# Patient Record
Sex: MALE
[De-identification: names, ages, dates, MRNs, and addresses within clinical notes are randomized per-mention and may not be internally consistent; named-entity substitution may affect disease eponyms.]

---

## 2023-06-01 PROBLEM — Z00.00 ENCOUNTER FOR PREVENTIVE HEALTH EXAMINATION: Status: ACTIVE | Noted: 2023-06-01

## 2024-03-14 ENCOUNTER — APPOINTMENT (OUTPATIENT)
Dept: ORTHOPEDIC SURGERY | Facility: CLINIC | Age: 54
End: 2024-03-14
Payer: OTHER MISCELLANEOUS

## 2024-03-14 VITALS — WEIGHT: 190 LBS | BODY MASS INDEX: 28.14 KG/M2 | HEIGHT: 69 IN

## 2024-03-14 DIAGNOSIS — Z87.19 PERSONAL HISTORY OF OTHER DISEASES OF THE DIGESTIVE SYSTEM: ICD-10-CM

## 2024-03-14 DIAGNOSIS — Z78.9 OTHER SPECIFIED HEALTH STATUS: ICD-10-CM

## 2024-03-14 DIAGNOSIS — I10 ESSENTIAL (PRIMARY) HYPERTENSION: ICD-10-CM

## 2024-03-14 PROCEDURE — 99204 OFFICE O/P NEW MOD 45 MIN: CPT

## 2024-03-14 PROCEDURE — 73030 X-RAY EXAM OF SHOULDER: CPT | Mod: LT

## 2024-03-14 RX ORDER — OMEPRAZOLE 10 MG/1
10 CAPSULE, DELAYED RELEASE ORAL
Refills: 0 | Status: ACTIVE | COMMUNITY

## 2024-03-14 RX ORDER — LISINOPRIL 30 MG/1
TABLET ORAL
Refills: 0 | Status: ACTIVE | COMMUNITY

## 2024-03-14 RX ORDER — CYCLOBENZAPRINE HYDROCHLORIDE 5 MG/1
5 TABLET, FILM COATED ORAL
Qty: 30 | Refills: 2 | Status: ACTIVE | COMMUNITY
Start: 2024-03-14 | End: 1900-01-01

## 2024-03-14 NOTE — DATA REVIEWED
[Left] : left [MRI] : MRI [Shoulder] : shoulder [I independently reviewed and interpreted images and report] : I independently reviewed and interpreted images and report [FreeTextEntry1] : post procedure MRI shows high grade partial tear upper subscapularis with mild atrophy, low grade partial supra/infra tearing, proximal biceps proximal tenodesis with attenuation vs rupture into the groove. type 2 acromion. AC arthrosis

## 2024-03-14 NOTE — WORK
[Torn Ligament/Tendon/Muscle] : torn ligament, tendon or muscle [Was the competent medical cause of the injury] : was the competent medical cause of the injury [Are consistent with the injury] : are consistent with the injury [Consistent with my objective findings] : consistent with my objective findings [Mild Partial] : mild partial [Does not reveal pre-existing condition(s) that may affect treatment/prognosis] : does not reveal pre-existing condition(s) that may affect treatment/prognosis [Can return to work without limitations on ______] : can return to work without limitations on [unfilled] [N/A] : : Not Applicable [Patient] : patient [No Rx restrictions] : No Rx restrictions. [I provided the services listed above] :  I provided the services listed above. [Heavy Work:] : Heavy Work: Exerting 50 to 100 pounds of force occasionally, and/or 25 to 50 pounds of force frequently, and/or 10 to 20 pounds of force constantly to move objects. Physical demand requirements are in excess of those for Medium Work [FreeTextEntry1] : fair

## 2024-03-14 NOTE — DISCUSSION/SUMMARY
[de-identified] : History, clinical examination and imaging were most consistent with: -left shoulder recurrent high grade partial subscapularis tear, proximal biceps rupture   The diagnosis was explained in detail. The potential non-surgical and surgical treatments were reviewed. The relative risks and benefits of each option were considered relative to the patients age, activity level, medical history, symptom severity and previously attempted treatments.   The patient was advised to consult with their primary medical provider prior to initiation of any new medications to reduce the risk of adverse effects specific to their long-term home medications and medical history. The risk of gastrointestinal irritation and kidney injury specific to long-term NSAID use was discussed.   -Discussed the complex nature of his condition. We discussed continued non-surgical treatment versus revision surgery with arthroscopic subscapularis RCR, debridement, SAD and open biceps tenodesis. Discussed the risks of surgery and expected recovery. Patient wishes to continue with non-surgical treatment at this time.  -Patient defers additional PT and will proceed with a home exercise program. -Cortisone injection is discussed and deferred at this time. -Over the counter acetaminophen as needed for pain. -Flexeril as needed for spasms. -Patient can continue working full duty.  -Follow up in 6 weeks. Consider CSI vs surgical options if symptoms persist.    (MDM)   Problem Complexity -Moderate: acute, complicated injury  Risk -Moderate: prescription medication   -Patient has not been seen by another provider in my practice within the past 2 years who specializes in orthopedic surgery.

## 2024-03-14 NOTE — IMAGING
[Left] : left shoulder [There are no fractures, subluxations or dislocations. No significant abnormalities are seen] : There are no fractures, subluxations or dislocations. No significant abnormalities are seen [de-identified] : (Exam: Shoulder)   Laterality is left   Patient is in no acute distress, alert and oriented Sensation is grossly intact to light touch in the hand Motor function is 5/5 in the hand Capillary refill is less than 2 seconds in the fingers Lymphadenopathy is not present Peripheral edema is not present   Skin is intact Swelling is not present Atrophy is not present Scapular winging is not present Deformity of the AC joint is not present Deformity of the biceps is not present   Bicipital groove tenderness is present AC joint tenderness is not present Scapulothoracic tenderness is not present   Active forward elevation is 160 Passive forward elevation is 175 External rotation at the side is 60 Internal rotation behind the back is to the level of T12   Forward elevation strength is 5-/5 External rotation strength at the side is 5/5 Internal rotation strength at the side is 5-/5 Deltoid strength of anterior, posterior and lateral heads is 5/5   Peck test is abnormal OBriens test is abnormal Empty can test is abnormal Speeds test is abnormal Cross body adduction test is normal Belly press test is normal Apprehension and relocation is normal Sulcus sign is normal

## 2024-03-14 NOTE — HISTORY OF PRESENT ILLNESS
[de-identified] : Date of initial evaluation 03/14/2024 Patient age is 53 year  Occupation is Asphalt sauer Body part causing symptoms is the LT shoulder  Symptoms began 10/12/22, he was at work, coming down a ladder, he slipped and the arm was hyper extended He continued to work on the day of injury, went to [facility] the next day He was diagnosed with a rotator cuff and labral injury and treated by Dr Richard Gallegos He had shoulder surgery on 2/10/23 by Dr Gallegos, op report states debridement, SAD, arthroscopic BT. op report states the tenodesis anchor broke during insertion He went back to work 3 months later on 5/10/23 Since that time he has been working full duty He reports that it has remained painful since the surgery Location of pain is lateral Quality of pain is sharp and burning  Pain score at rest is 6 Pain score during activity is 9 Radicular symptoms are not present Prior treatments since the surgery include CSI x2 most recently in November 2023 with partial relief He has done extensive post procedure PT Patient's condition is associated with workers compensation, DOI 10/12/22

## 2024-04-20 ENCOUNTER — APPOINTMENT (OUTPATIENT)
Dept: ORTHOPEDIC SURGERY | Facility: CLINIC | Age: 54
End: 2024-04-20
Payer: OTHER MISCELLANEOUS

## 2024-04-20 PROCEDURE — 99204 OFFICE O/P NEW MOD 45 MIN: CPT

## 2024-04-20 PROCEDURE — 99203 OFFICE O/P NEW LOW 30 MIN: CPT

## 2024-04-22 NOTE — HISTORY OF PRESENT ILLNESS
[Neck] : neck [Work related] : work related [Sudden] : sudden [Dull/Aching] : dull/aching [Radiating] : radiating [Sharp] : sharp [Constant] : constant [Household chores] : household chores [Leisure] : leisure [Sleep] : sleep [Full time] : Work status: full time [de-identified] : Patient presents for evaluation of cervical spine. Reports work related injury on 10/12/2022. Describes a fall from a ladder, resulted in left RTC and labral tear for which surgery was performed. Describes persistent neck pain, radiation of pain and paresthesias to the left arm, forearm and hand. Treated with trigger point injections and muscle relaxers. [] : no [FreeTextEntry3] : 10/12/2022 [FreeTextEntry5] : COMING DOWN LADDER, SLIPPED AND HELD ON WITH LEFT ARM  [FreeTextEntry7] : DOWN LEFT ARM  [de-identified] : [facility]  [de-identified] : [facility]  [de-identified] : [facility]

## 2024-04-22 NOTE — DISCUSSION/SUMMARY
[de-identified] : Discussed medical mgmt, judicious use muscle relaxers. Nsaids contraindicated with Barretts esophagus. Continue exercise based rehabilitation and activity modification. Referred to pain mgmt for LOVE. Discussed potential surtgical candidacy if refractory to non operative mgmt. Cumulative encounter duration exceeded 45 minutes.

## 2024-04-22 NOTE — PHYSICAL EXAM
[Normal Coordination] : normal coordination [Normal DTR UE/LE] : normal DTR UE/LE  [Normal Sensation] : normal sensation [Normal Mood and Affect] : normal mood and affect [Oriented] : oriented [Able to Communicate] : able to communicate [Normal Skin] : normal skin [No Rash] : no rash [No Ulcers] : no ulcers [No Lesions] : no lesions [No obvious lymphadenopathy in areas examined] : no obvious lymphadenopathy in areas examined [Well Developed] : well developed [Peripheral vascular exam is grossly normal] : peripheral vascular exam is grossly normal [No Respiratory Distress] : no respiratory distress [Extension] : extension [] : positive Spurling

## 2024-04-25 ENCOUNTER — APPOINTMENT (OUTPATIENT)
Dept: ORTHOPEDIC SURGERY | Facility: CLINIC | Age: 54
End: 2024-04-25
Payer: OTHER MISCELLANEOUS

## 2024-04-25 VITALS — HEIGHT: 69 IN | WEIGHT: 189 LBS | BODY MASS INDEX: 27.99 KG/M2

## 2024-04-25 PROCEDURE — 20610 DRAIN/INJ JOINT/BURSA W/O US: CPT | Mod: LT

## 2024-04-25 PROCEDURE — 99214 OFFICE O/P EST MOD 30 MIN: CPT | Mod: 25

## 2024-04-25 NOTE — IMAGING
[Left] : left shoulder [There are no fractures, subluxations or dislocations. No significant abnormalities are seen] : There are no fractures, subluxations or dislocations. No significant abnormalities are seen [de-identified] : (Exam: Shoulder)   Laterality is left   Patient is in no acute distress, alert and oriented Sensation is grossly intact to light touch in the hand Motor function is 5/5 in the hand Capillary refill is less than 2 seconds in the fingers Lymphadenopathy is not present Peripheral edema is not present   Skin is intact Swelling is not present Atrophy is not present Scapular winging is not present Deformity of the AC joint is not present Deformity of the biceps is not present   Bicipital groove tenderness is present AC joint tenderness is not present Scapulothoracic tenderness is not present   Active forward elevation is 160 Passive forward elevation is 175 External rotation at the side is 60 Internal rotation behind the back is to the level of T12   Forward elevation strength is 5-/5 External rotation strength at the side is 5/5 Internal rotation strength at the side is 5-/5 Deltoid strength of anterior, posterior and lateral heads is 5/5   Peck test is abnormal OBriens test is abnormal Empty can test is abnormal Speeds test is abnormal Cross body adduction test is normal Belly press test is normal Apprehension and relocation is normal Sulcus sign is normal

## 2024-04-25 NOTE — DISCUSSION/SUMMARY
[de-identified] : History, clinical examination and imaging were most consistent with: -left shoulder recurrent high grade partial subscapularis tear, proximal biceps rupture   The diagnosis was explained in detail. The potential non-surgical and surgical treatments were reviewed. The relative risks and benefits of each option were considered relative to the patients age, activity level, medical history, symptom severity and previously attempted treatments.   The patient was advised to consult with their primary medical provider prior to initiation of any new medications to reduce the risk of adverse effects specific to their long-term home medications and medical history. The risk of gastrointestinal irritation and kidney injury specific to long-term NSAID use was discussed.   -Patient wishes to continue working and defers surgical treatment at this time. -Cortisone injection performed today for symptom relief. -Discussed that if symptoms persist he is candidate for revision shoulder surgery with arthroscopic subscapularis RCR, debridement, SAD and open biceps tenodesis. -Patient defers additional PT and will proceed with a home exercise program. -Over the counter acetaminophen as needed for pain. -Flexeril as needed for spasms. -Work status remains full duty.  -Cervical care as per Dr. Taylor.  -Follow up in 2 months, will revisit surgical options if symptoms persist.   (MDM)   Problem Complexity -Moderate: acute, complicated injury  Risk -Moderate: injection  (Procedure: Corticosteroid Injection)   Injection location was the: -left subacromial bursa   Injection contents were: 40 mg of kenalog and 5 mL of 1% lidocaine   Procedure Details: -Informed consent was obtained. Discussed possible risks of corticosteroid injection including hyperglycemia, infection and skin discoloration. -Discussed that due to infection risk, an interval between injection and any future surgery is 6 weeks for arthroscopy and 3 months for arthroplasty. -Injection performed using aseptic technique. Hemostasis was confirmed. -Procedure was tolerated well with no complications.

## 2024-04-25 NOTE — HISTORY OF PRESENT ILLNESS
[de-identified] : 4/25/24: Follow up on left shoulder. doing HEP. working full duty. ongoing cervical care by dr rich and planning for epidural.   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 the next day He was diagnosed with a rotator cuff and labral injury and treated by Dr Richard Gallegos He had shoulder surgery on 2/10/23 by Dr Gallegos, op report states debridement, SAD, arthroscopic BT. op report states the tenodesis anchor broke during insertion He went back to work 3 months later on 5/10/23 Since that time he has been working full duty He reports that it has remained painful since the surgery Location of pain is lateral Quality of pain is sharp and burning  Pain score at rest is 6 Pain score during activity is 9 Radicular symptoms are not present Prior treatments since the surgery include CSI x2 most recently in November 2023 with partial relief He has done extensive post procedure PT Patient's condition is associated with workers compensation, DOI 10/12/22

## 2024-04-25 NOTE — DATA REVIEWED
[MRI] : MRI [Left] : left [Shoulder] : shoulder [I independently reviewed and interpreted images and report] : I independently reviewed and interpreted images and report [FreeTextEntry1] : post procedure MRI shows high grade partial tear upper subscapularis with mild atrophy, low grade partial supra/infra tearing, proximal biceps proximal tenodesis with attenuation vs rupture into the groove. type 2 acromion. AC arthrosis

## 2024-06-03 ENCOUNTER — APPOINTMENT (OUTPATIENT)
Dept: ORTHOPEDIC SURGERY | Facility: CLINIC | Age: 54
End: 2024-06-03
Payer: OTHER MISCELLANEOUS

## 2024-06-03 VITALS — WEIGHT: 189 LBS | HEIGHT: 69 IN | BODY MASS INDEX: 27.99 KG/M2

## 2024-06-03 DIAGNOSIS — M54.12 RADICULOPATHY, CERVICAL REGION: ICD-10-CM

## 2024-06-03 PROCEDURE — 99214 OFFICE O/P EST MOD 30 MIN: CPT

## 2024-06-04 PROBLEM — M54.12 CERVICAL RADICULOPATHY, CHRONIC: Status: ACTIVE | Noted: 2024-04-20

## 2024-06-04 NOTE — DISCUSSION/SUMMARY
[de-identified] : Discussed medical mgmt. Nsaids contraindicated with Barretts esophagus, c/t Tylenol prn. Periodic TPI vs shoulder CSI. Continue exercise based rehabilitation and activity modification. Pt elects to hold off on LOVE due to controlled symptoms and hesitancy toward injection. Discussed potential surgical candidacy(ACDF C5-6) if refractory to non operative mgmt. F/u 6/8ntinuation of full duty work. F/u 6/8 wks. Cumulative encounter duration exceeded 30 minutes.  Prior to appointment and during encounter with patient extensive medical records were reviewed including but not limited to, hospital records, outpatient records, imaging results, and lab data. During this appointment the patient was examined, diagnoses were discussed and explained in a face to face manner. In addition extensive time was spent reviewing aforementioned diagnostic studies. Counseling including abnormal image results, differential diagnoses, treatment options, risk and benefits, lifestyle changes, current condition, and current medications was performed. Patient's comments, questions, and concerns were addressed and patient verbalized understanding. Based on this patient's presentation at our office, which is an orthopedic spine surgeon's office, this patient inherently / intrinsically has a risk, however minute, of developing issues such as Cauda equina syndrome, bowel and bladder changes, or progression of motor or neurological deficits such as paralysis which may be permanent.  MARYBETH DUPREE Acting as a Scribe for Dr. Brandon GARCIA, Marybeth Dupree, attest that this documentation has been prepared under the direction and in the presence of Provider Doroteo Taylor MD.

## 2024-06-04 NOTE — HISTORY OF PRESENT ILLNESS
[7] : 7 [2] : 2 [Full time] : Work status: full time [] : yes [de-identified] : 06/03/2024 - Patient presents for fuv. C/o neck pain with intermittent pain into left arm, Pain worsens with increased physical atcivity. Periodic trigger points helpful for symptom control. Under care with Dr. Tyson with csi in shoulder, which he states helped his pain in left shoulder and neck. Limited nsaids due to barets dx, takes Tylenol. Cyclobenzaprine at night caused grogginess, d/c.   04/20/2024 - Patient presents for evaluation of cervical spine. Reports work related injury on 10/12/2022. Describes a fall from a ladder, resulted in left RTC and labral tear for which surgery was performed. Describes persistent neck pain, radiation of pain and paresthesias to the left arm, forearm and hand. Treated with trigger point injections and muscle relaxers. [de-identified] : pain mgmt- tpi

## 2024-06-27 ENCOUNTER — APPOINTMENT (OUTPATIENT)
Dept: ORTHOPEDIC SURGERY | Facility: CLINIC | Age: 54
End: 2024-06-27
Payer: OTHER MISCELLANEOUS

## 2024-06-27 VITALS — HEIGHT: 69 IN | WEIGHT: 190 LBS | BODY MASS INDEX: 28.14 KG/M2

## 2024-06-27 DIAGNOSIS — M75.112 INCOMPLETE ROTATOR CUFF TEAR OR RUPTURE OF LEFT SHOULDER, NOT SPECIFIED AS TRAUMATIC: ICD-10-CM

## 2024-06-27 DIAGNOSIS — S46.212A STRAIN OF MUSCLE, FASCIA AND TENDON OF OTHER PARTS OF BICEPS, LEFT ARM, INITIAL ENCOUNTER: ICD-10-CM

## 2024-06-27 PROCEDURE — 99214 OFFICE O/P EST MOD 30 MIN: CPT

## 2024-07-15 ENCOUNTER — APPOINTMENT (OUTPATIENT)
Dept: ORTHOPEDIC SURGERY | Facility: CLINIC | Age: 54
End: 2024-07-15
Payer: OTHER MISCELLANEOUS

## 2024-07-15 VITALS — WEIGHT: 190 LBS | HEIGHT: 69 IN | BODY MASS INDEX: 28.14 KG/M2

## 2024-07-15 DIAGNOSIS — M54.12 RADICULOPATHY, CERVICAL REGION: ICD-10-CM

## 2024-07-15 PROCEDURE — 99214 OFFICE O/P EST MOD 30 MIN: CPT

## 2024-08-08 ENCOUNTER — APPOINTMENT (OUTPATIENT)
Dept: ORTHOPEDIC SURGERY | Facility: CLINIC | Age: 54
End: 2024-08-08

## 2024-08-08 PROBLEM — S63.501A SPRAIN OF RIGHT WRIST, INITIAL ENCOUNTER: Status: ACTIVE | Noted: 2024-08-08

## 2024-08-08 PROBLEM — M75.111 INCOMPLETE TEAR OF RIGHT ROTATOR CUFF, UNSPECIFIED WHETHER TRAUMATIC: Status: ACTIVE | Noted: 2024-08-08

## 2024-08-08 PROCEDURE — 73030 X-RAY EXAM OF SHOULDER: CPT | Mod: RT

## 2024-08-08 PROCEDURE — 73110 X-RAY EXAM OF WRIST: CPT | Mod: RT

## 2024-08-08 PROCEDURE — 99214 OFFICE O/P EST MOD 30 MIN: CPT

## 2024-08-08 NOTE — WORK
[Sprain/Strain] : sprain/strain [Torn Ligament/Tendon/Muscle] : torn ligament, tendon or muscle [Was the competent medical cause of the injury] : was the competent medical cause of the injury [Are consistent with the injury] : are consistent with the injury [Consistent with my objective findings] : consistent with my objective findings [Severe Partial] : severe partial [Does not reveal pre-existing condition(s) that may affect treatment/prognosis] : does not reveal pre-existing condition(s) that may affect treatment/prognosis [Can return to work with limitations on ______] : can return to work with limitations on [unfilled] [Patient] : patient [No Rx restrictions] : No Rx restrictions. [I provided the services listed above] :  I provided the services listed above. [FreeTextEntry1] : fair [Light Work:] : Light Work: Exerting up to 20 pounds of force occasionally, and/or up to 10 pounds of force frequently and/or negligible amount of force constantly to move objects. Physical demand requirements are in excess of those for Sedentary Work. Even though the weight lifted may only be a negligible amount, a job should be rated Light Work: (1) when it requires walking or standing to a significant degree: or (2) when it requires sitting most of the time but entails pushing and/or pulling of arm or leg controls: and/or (3) when the job requires working at a production rate pace entailing the constant pushing and/or pulling of materials even though the weight of those materials is negligible. NOTE: The constant stress of maintaining a production rate pace, especially in an industrial setting, can be and is physically demanding of a worker even though the amount of force exerted is negligible.

## 2024-08-08 NOTE — DISCUSSION/SUMMARY
[de-identified] : History, clinical examination and imaging were most consistent with: -right shoulder rotator cuff partial vs complete tear -right wrist sprain   The diagnosis was explained in detail. The potential non-surgical and surgical treatments were reviewed. The relative risks and benefits of each option were considered relative to the patients age, activity level, medical history, symptom severity and previously attempted treatments.   The patient was advised to consult with their primary medical provider prior to initiation of any new medications to reduce the risk of adverse effects specific to their long-term home medications and medical history. The risk of gastrointestinal irritation and kidney injury specific to long-term NSAID use was discussed.   -Medrol dose pack prescribed for pain. -MRI of the shoulder ordered to evaluate for rotator cuff and/or labral tear. -MRI of the wrist ordered to evaluate for ligament tear and/or occult fracture. -Work status will remain light duty. -Follow up with hand specialist Dr. Castano when wrist MRI completed. -Follow up with me when shoulder MRI completed.   (MDM)   Problem Complexity -Moderate: acute, complicated injury   Risk -Moderate: prescription medication

## 2024-08-08 NOTE — HISTORY OF PRESENT ILLNESS
[de-identified] : Date of initial evaluation is 08/08/2024 Patient age is 54 year  Occupation is [employer]  Body part causing symptoms is the right shoulder, right wrist Symptoms began 08/05/2024, he was struck by a machine and fell onto his right side Location of pain is lateral shoulder, dorsal wrist He denies right shoulder or wrist problem before the injury He has been working light duty since the injury Quality of pain is sharp Pain score at rest is 8/10 Pain score during activity is 10/10 Radicular symptoms are not present Prior treatments include Tylenol  Patient's condition is associated with workers compensation

## 2024-08-08 NOTE — DISCUSSION/SUMMARY
[de-identified] : History, clinical examination and imaging were most consistent with: -right shoulder rotator cuff partial vs complete tear -right wrist sprain   The diagnosis was explained in detail. The potential non-surgical and surgical treatments were reviewed. The relative risks and benefits of each option were considered relative to the patients age, activity level, medical history, symptom severity and previously attempted treatments.   The patient was advised to consult with their primary medical provider prior to initiation of any new medications to reduce the risk of adverse effects specific to their long-term home medications and medical history. The risk of gastrointestinal irritation and kidney injury specific to long-term NSAID use was discussed.   -Medrol dose pack prescribed for pain. -MRI of the shoulder ordered to evaluate for rotator cuff and/or labral tear. -MRI of the wrist ordered to evaluate for ligament tear and/or occult fracture. -Work status will remain light duty. -Follow up with hand specialist Dr. Castano when wrist MRI completed. -Follow up with me when shoulder MRI completed.   (MDM)   Problem Complexity -Moderate: acute, complicated injury   Risk -Moderate: prescription medication

## 2024-08-08 NOTE — IMAGING
[de-identified] : (Exam: Shoulder)   Laterality is right    Patient is in no acute distress, alert and oriented Sensation is grossly intact to light touch in the hand Motor function is 5/5 in the hand Capillary refill is less than 2 seconds in the fingers Lymphadenopathy is not present Peripheral edema is not present   Skin is intact Swelling is not present Atrophy is not present Scapular winging is not present Deformity of the AC joint is not present Deformity of the biceps is not present   Bicipital groove tenderness is present AC joint tenderness is not present Scapulothoracic tenderness is not present   Active forward elevation is 120 Passive forward elevation is 150 External rotation at the side is 60 Internal rotation behind the back is to the level of T12   Forward elevation strength is 4/5 External rotation strength at the side is 5/5 Internal rotation strength at the side is 5/5 Deltoid strength of anterior, posterior and lateral heads is 5/5   Peck test is abnormal OBriens test is abnormal Empty can test is abnormal Speeds test is abnormal Cross body adduction test is normal Belly press test is normal Apprehension and relocation is normal Sulcus sign is normal  (Exam: Wrist and Hand)   Laterality is right    Patient is in no acute distress, alert and oriented Sensation is grossly intact to light touch in the hand Motor function is 5/5 in the hand Capillary refill is less than 2 seconds in the fingers Lymphadenopathy is not present Peripheral edema is not present   Skin is intact Nails are intact Swelling is not present Scissoring of digits is not present Extensor lag of digits is not present Atrophy of hand musculature is not present  Snuffbox tenderness is not present Distal radius tenderness is present Distal ulna tenderness is present Metacarpal tenderness is not present Finger tenderness is not present   Wrist flexion is 70 Wrist extension is 70 Pronation is 80 Supination is 80 Finger flexion is to palm   Wrist extension strength is 5/5 Wrist flexion strength is 5/5 Finger flexion strength is 5/5 Finger extension strength is 5/5 Finger abduction strength is 5/5   Tinel test at wrist is normal Tinel test at elbow is normal Cooley shift test is normal DRU shuck test is normal Finklestein test is normal    [Right] : right wrist [There are no fractures, subluxations or dislocations. No significant abnormalities are seen] : There are no fractures, subluxations or dislocations. No significant abnormalities are seen

## 2024-08-08 NOTE — HISTORY OF PRESENT ILLNESS
[de-identified] : Date of initial evaluation is 08/08/2024 Patient age is 54 year  Occupation is [employer]  Body part causing symptoms is the right shoulder, right wrist Symptoms began 08/05/2024, he was struck by a machine and fell onto his right side Location of pain is lateral shoulder, dorsal wrist He denies right shoulder or wrist problem before the injury He has been working light duty since the injury Quality of pain is sharp Pain score at rest is 8/10 Pain score during activity is 10/10 Radicular symptoms are not present Prior treatments include Tylenol  Patient's condition is associated with workers compensation

## 2024-08-08 NOTE — IMAGING
[de-identified] : (Exam: Shoulder)   Laterality is right    Patient is in no acute distress, alert and oriented Sensation is grossly intact to light touch in the hand Motor function is 5/5 in the hand Capillary refill is less than 2 seconds in the fingers Lymphadenopathy is not present Peripheral edema is not present   Skin is intact Swelling is not present Atrophy is not present Scapular winging is not present Deformity of the AC joint is not present Deformity of the biceps is not present   Bicipital groove tenderness is present AC joint tenderness is not present Scapulothoracic tenderness is not present   Active forward elevation is 120 Passive forward elevation is 150 External rotation at the side is 60 Internal rotation behind the back is to the level of T12   Forward elevation strength is 4/5 External rotation strength at the side is 5/5 Internal rotation strength at the side is 5/5 Deltoid strength of anterior, posterior and lateral heads is 5/5   Pekc test is abnormal OBriens test is abnormal Empty can test is abnormal Speeds test is abnormal Cross body adduction test is normal Belly press test is normal Apprehension and relocation is normal Sulcus sign is normal  (Exam: Wrist and Hand)   Laterality is right    Patient is in no acute distress, alert and oriented Sensation is grossly intact to light touch in the hand Motor function is 5/5 in the hand Capillary refill is less than 2 seconds in the fingers Lymphadenopathy is not present Peripheral edema is not present   Skin is intact Nails are intact Swelling is not present Scissoring of digits is not present Extensor lag of digits is not present Atrophy of hand musculature is not present  Snuffbox tenderness is not present Distal radius tenderness is present Distal ulna tenderness is present Metacarpal tenderness is not present Finger tenderness is not present   Wrist flexion is 70 Wrist extension is 70 Pronation is 80 Supination is 80 Finger flexion is to palm   Wrist extension strength is 5/5 Wrist flexion strength is 5/5 Finger flexion strength is 5/5 Finger extension strength is 5/5 Finger abduction strength is 5/5   Tinel test at wrist is normal Tinel test at elbow is normal Cooley shift test is normal DRU shuck test is normal Finklestein test is normal    [Right] : right wrist [There are no fractures, subluxations or dislocations. No significant abnormalities are seen] : There are no fractures, subluxations or dislocations. No significant abnormalities are seen

## 2024-08-15 ENCOUNTER — APPOINTMENT (OUTPATIENT)
Dept: ORTHOPEDIC SURGERY | Facility: CLINIC | Age: 54
End: 2024-08-15
Payer: OTHER MISCELLANEOUS

## 2024-08-15 DIAGNOSIS — S46.212A STRAIN OF MUSCLE, FASCIA AND TENDON OF OTHER PARTS OF BICEPS, LEFT ARM, INITIAL ENCOUNTER: ICD-10-CM

## 2024-08-15 DIAGNOSIS — M75.112 INCOMPLETE ROTATOR CUFF TEAR OR RUPTURE OF LEFT SHOULDER, NOT SPECIFIED AS TRAUMATIC: ICD-10-CM

## 2024-08-15 PROCEDURE — 99214 OFFICE O/P EST MOD 30 MIN: CPT

## 2024-08-15 NOTE — WORK
[Sprain/Strain] : sprain/strain [Torn Ligament/Tendon/Muscle] : torn ligament, tendon or muscle [Was the competent medical cause of the injury] : was the competent medical cause of the injury [Are consistent with the injury] : are consistent with the injury [Consistent with my objective findings] : consistent with my objective findings [Mild Partial] : mild partial [Does not reveal pre-existing condition(s) that may affect treatment/prognosis] : does not reveal pre-existing condition(s) that may affect treatment/prognosis [N/A] : : Not Applicable [Patient] : patient [No Rx restrictions] : No Rx restrictions. [I provided the services listed above] :  I provided the services listed above. [FreeTextEntry1] : fair

## 2024-08-15 NOTE — IMAGING
[Left] : left shoulder [Right] : right wrist [There are no fractures, subluxations or dislocations. No significant abnormalities are seen] : There are no fractures, subluxations or dislocations. No significant abnormalities are seen [de-identified] : (Exam: Shoulder)   Laterality is left   Patient is in no acute distress, alert and oriented Sensation is grossly intact to light touch in the hand Motor function is 5/5 in the hand Capillary refill is less than 2 seconds in the fingers Lymphadenopathy is not present Peripheral edema is not present   Skin is intact Swelling is not present Atrophy is not present Scapular winging is not present Deformity of the AC joint is not present Deformity of the biceps is not present   Bicipital groove tenderness is present AC joint tenderness is not present Scapulothoracic tenderness is not present   Active forward elevation is 160 Passive forward elevation is 175 External rotation at the side is 60 Internal rotation behind the back is to the level of T12   Forward elevation strength is 5-/5 External rotation strength at the side is 5/5 Internal rotation strength at the side is 5-/5 Deltoid strength of anterior, posterior and lateral heads is 5/5   Peck test is abnormal OBriens test is abnormal Empty can test is abnormal Speeds test is abnormal Cross body adduction test is normal Belly press test is normal Apprehension and relocation is normal Sulcus sign is normal

## 2024-08-15 NOTE — DISCUSSION/SUMMARY
[de-identified] : History, clinical examination and imaging were most consistent with: -left shoulder recurrent high grade partial subscapularis tear, proximal biceps rupture   The diagnosis was explained in detail. The potential non-surgical and surgical treatments were reviewed. The relative risks and benefits of each option were considered relative to the patients age, activity level, medical history, symptom severity and previously attempted treatments.   The patient was advised to consult with their primary medical provider prior to initiation of any new medications to reduce the risk of adverse effects specific to their long-term home medications and medical history. The risk of gastrointestinal irritation and kidney injury specific to long-term NSAID use was discussed.   -Patient's left shoulder symptoms are unchanged. He wishes to avoid additional interventions at this time.  -Continue home exercise program. -Repeat injection is deferred today.  -Over the counter acetaminophen as needed for pain. -Flexeril as needed for spasms. -Work status remains full duty for the LT shoulder. He is light duty for the right shoulder due to other WC injury.  -Discussed that if symptoms persist he is candidate for revision shoulder surgery with arthroscopic subscapularis RCR, debridement, SAD and open biceps tenodesis. -Follow up in 2 months, consider repeat CSI if symptoms persist.  (MDM)   Problem Complexity -Moderate: acute, complicated injury  Risk -Moderate: prescription medication

## 2024-08-15 NOTE — HISTORY OF PRESENT ILLNESS
[de-identified] : 08/15/2024: f/u for left shoulder. Taking Cyclobenzaprine prn. Symptoms remain unchanged. Working light duty due to his right shoulder other WC injury. full duty for the LT shoulder.   06/27/24: Follow up on left shoulder. Patient had CSI last visit with relief. Patient has occasional pain. taking tylenol and flexeril. still working full duty.   4/25/24: Follow up on left shoulder. doing HEP. working full duty. ongoing cervical care by dr rich and planning for epidural.   Date of initial evaluation 03/14/2024 Patient age is 53 year  Occupation is Asphalt sauer Body part causing symptoms is the LT shoulder  Symptoms began 10/12/22, he was at work, coming down a ladder, he slipped and the arm was hyper extended He continued to work on the day of injury, went to [facility] the next day He was diagnosed with a rotator cuff and labral injury and treated by Dr Richard Gallegos He had shoulder surgery on 2/10/23 by Dr Gallegos, op report states debridement, SAD, arthroscopic BT. op report states the tenodesis anchor broke during insertion He went back to work 3 months later on 5/10/23 Since that time he has been working full duty He reports that it has remained painful since the surgery Location of pain is lateral Quality of pain is sharp and burning  Pain score at rest is 6 Pain score during activity is 9 Radicular symptoms are not present Prior treatments since the surgery include CSI x2 most recently in November 2023 with partial relief He has done extensive post procedure PT Patient's condition is associated with workers compensation, DOI 10/12/22

## 2024-08-19 ENCOUNTER — RESULT REVIEW (OUTPATIENT)
Age: 54
End: 2024-08-19

## 2024-08-22 ENCOUNTER — APPOINTMENT (OUTPATIENT)
Dept: ORTHOPEDIC SURGERY | Facility: CLINIC | Age: 54
End: 2024-08-22
Payer: OTHER MISCELLANEOUS

## 2024-08-22 DIAGNOSIS — S43.431A SUPERIOR GLENOID LABRUM LESION OF RIGHT SHOULDER, INITIAL ENCOUNTER: ICD-10-CM

## 2024-08-22 DIAGNOSIS — M75.111 INCOMPLETE ROTATOR CUFF TEAR OR RUPTURE OF RIGHT SHOULDER, NOT SPECIFIED AS TRAUMATIC: ICD-10-CM

## 2024-08-22 PROCEDURE — 99214 OFFICE O/P EST MOD 30 MIN: CPT

## 2024-08-22 NOTE — WORK
[Sprain/Strain] : sprain/strain [Torn Ligament/Tendon/Muscle] : torn ligament, tendon or muscle [Was the competent medical cause of the injury] : was the competent medical cause of the injury [Are consistent with the injury] : are consistent with the injury [Consistent with my objective findings] : consistent with my objective findings [Does not reveal pre-existing condition(s) that may affect treatment/prognosis] : does not reveal pre-existing condition(s) that may affect treatment/prognosis [N/A] : : Not Applicable [Patient] : patient [No Rx restrictions] : No Rx restrictions. [I provided the services listed above] :  I provided the services listed above.

## 2024-08-22 NOTE — DATA REVIEWED
[MRI] : MRI [Shoulder] : shoulder [I independently reviewed and interpreted images and report] : I independently reviewed and interpreted images and report

## 2024-08-22 NOTE — IMAGING
[Left] : left shoulder [Right] : right wrist [There are no fractures, subluxations or dislocations. No significant abnormalities are seen] : There are no fractures, subluxations or dislocations. No significant abnormalities are seen

## 2024-08-26 ENCOUNTER — APPOINTMENT (OUTPATIENT)
Dept: ORTHOPEDIC SURGERY | Facility: CLINIC | Age: 54
End: 2024-08-26
Payer: OTHER MISCELLANEOUS

## 2024-08-26 VITALS — BODY MASS INDEX: 28.14 KG/M2 | HEIGHT: 69 IN | WEIGHT: 190 LBS

## 2024-08-26 DIAGNOSIS — M54.12 RADICULOPATHY, CERVICAL REGION: ICD-10-CM

## 2024-08-26 PROCEDURE — 99214 OFFICE O/P EST MOD 30 MIN: CPT

## 2024-08-28 NOTE — DISCUSSION/SUMMARY
[de-identified] : 54 Y M with cervical radiculopathy. Nsaids contraindicated with Barretts esophagus, Diverticulitis. Continue exercise based rehabilitation and activity modification. Pt elects to hold off on LOVE due t6o hesitancy toward injection. Discussed potential surgical candidacy(ACDF C5-6) if refractory to non operative mgmt. Surgical risks, benefits and expected outcomes have been explained to the patient. Patient was understanding and in agreement. Pt will obtain TENS unit authorized by workers comp.  Follow up in 6 weeks  Prior to appointment and during encounter with patient extensive medical records were reviewed including but not limited to, hospital records, outpatient records, imaging results, and lab data. During this appointment the patient was examined, diagnoses were discussed and explained in a face to face manner. In addition extensive time was spent reviewing aforementioned diagnostic studies. Counseling including abnormal image results, differential diagnoses, treatment options, risk and benefits, lifestyle changes, current condition, and current medications was performed. Patient's comments, questions, and concerns were addressed and patient verbalized understanding. Based on this patient's presentation at our office, which is an orthopedic spine surgeon's office, this patient inherently / intrinsically has a risk, however minute, of developing issues such as Cauda equina syndrome, bowel and bladder changes, or progression of motor or neurological deficits such as paralysis which may be permanent.  MARYBETH DUPREE Acting as a Scribe for Dr. Brandon GARCIA, Marybeth Dupree, attest that this documentation has been prepared under the direction and in the presence of Provider Doroteo Taylor MD.

## 2024-08-28 NOTE — HISTORY OF PRESENT ILLNESS
[Neck] : neck [Work related] : work related [7] : 7 [Dull/Aching] : dull/aching [Constant] : constant [Household chores] : household chores [Rest] : rest [Meds] : meds [Standing] : standing [Full time] : Work status: full time [de-identified] : 08/26/2024 - Patient presenting for follow up. No real changes in symptoms - he complains of tingling in arms. States TENS unit was recently granted by  and he is looking to obtaining the unit. He is no longer in PT. DX barrettes esophagus and diverticulitis - not taking meds for pain. Not interested in pm injections. Expresses at some point he is considering cervical surgery in the future.   07/15/2024:  Patient presenting today for a follow up visit. Reports an episode last week of tightness and could not move his neck. Treated with ice, heat, cyclobenzaprine, and tens machine, which he found beneficial. Experiencing some radiating pain, but no numbness and tingling. Overall pain remains the same since last visit. Received CSI in shoulder, which provided relief. Strength is intact.  06/03/2024 - Patient presents for fuv. C/o neck pain with intermittent pain into left arm, Pain worsens with increased physical atcivity. Periodic trigger points helpful for symptom control. Under care with Dr. Tyson with csi in shoulder, which he states helped his pain in left shoulder and neck. Limited nsaids due to barets dx, takes Tylenol. Cyclobenzaprine at night caused grogginess, d/c.   04/20/2024 - Patient presents for evaluation of cervical spine. Reports work related injury on 10/12/2022. Describes a fall from a ladder, resulted in left RTC and labral tear for which surgery was performed. Describes persistent neck pain, radiation of pain and paresthesias to the left arm, forearm and hand. Treated with trigger point injections and muscle relaxers. [] : no [FreeTextEntry7] : Lion shoulders [FreeTextEntry3] : 10/12/2022 [de-identified] : [employer] [place]

## 2024-08-28 NOTE — HISTORY OF PRESENT ILLNESS
[Neck] : neck [Work related] : work related [7] : 7 [Dull/Aching] : dull/aching [Constant] : constant [Household chores] : household chores [Rest] : rest [Meds] : meds [Standing] : standing [Full time] : Work status: full time [de-identified] : 08/26/2024 - Patient presenting for follow up. No real changes in symptoms - he complains of tingling in arms. States TENS unit was recently granted by  and he is looking to obtaining the unit. He is no longer in PT. DX barrettes esophagus and diverticulitis - not taking meds for pain. Not interested in pm injections. Expresses at some point he is considering cervical surgery in the future.   07/15/2024:  Patient presenting today for a follow up visit. Reports an episode last week of tightness and could not move his neck. Treated with ice, heat, cyclobenzaprine, and tens machine, which he found beneficial. Experiencing some radiating pain, but no numbness and tingling. Overall pain remains the same since last visit. Received CSI in shoulder, which provided relief. Strength is intact.  06/03/2024 - Patient presents for fuv. C/o neck pain with intermittent pain into left arm, Pain worsens with increased physical atcivity. Periodic trigger points helpful for symptom control. Under care with Dr. Tyson with csi in shoulder, which he states helped his pain in left shoulder and neck. Limited nsaids due to barets dx, takes Tylenol. Cyclobenzaprine at night caused grogginess, d/c.   04/20/2024 - Patient presents for evaluation of cervical spine. Reports work related injury on 10/12/2022. Describes a fall from a ladder, resulted in left RTC and labral tear for which surgery was performed. Describes persistent neck pain, radiation of pain and paresthesias to the left arm, forearm and hand. Treated with trigger point injections and muscle relaxers. [] : no [FreeTextEntry3] : 10/12/2022 [FreeTextEntry7] : Lion shoulders [de-identified] : [employer] [place]

## 2024-08-28 NOTE — DISCUSSION/SUMMARY
[de-identified] : 54 Y M with cervical radiculopathy. Nsaids contraindicated with Barretts esophagus, Diverticulitis. Continue exercise based rehabilitation and activity modification. Pt elects to hold off on LOVE due t6o hesitancy toward injection. Discussed potential surgical candidacy(ACDF C5-6) if refractory to non operative mgmt. Surgical risks, benefits and expected outcomes have been explained to the patient. Patient was understanding and in agreement. Pt will obtain TENS unit authorized by workers comp.  Follow up in 6 weeks  Prior to appointment and during encounter with patient extensive medical records were reviewed including but not limited to, hospital records, outpatient records, imaging results, and lab data. During this appointment the patient was examined, diagnoses were discussed and explained in a face to face manner. In addition extensive time was spent reviewing aforementioned diagnostic studies. Counseling including abnormal image results, differential diagnoses, treatment options, risk and benefits, lifestyle changes, current condition, and current medications was performed. Patient's comments, questions, and concerns were addressed and patient verbalized understanding. Based on this patient's presentation at our office, which is an orthopedic spine surgeon's office, this patient inherently / intrinsically has a risk, however minute, of developing issues such as Cauda equina syndrome, bowel and bladder changes, or progression of motor or neurological deficits such as paralysis which may be permanent.  MARYBETH DUPREE Acting as a Scribe for Dr. Brandon GARCIA, Marybeth Dupree, attest that this documentation has been prepared under the direction and in the presence of Provider Doroteo Taylor MD.

## 2024-08-29 ENCOUNTER — APPOINTMENT (OUTPATIENT)
Dept: ORTHOPEDIC SURGERY | Facility: CLINIC | Age: 54
End: 2024-08-29
Payer: OTHER MISCELLANEOUS

## 2024-08-29 DIAGNOSIS — S46.212A STRAIN OF MUSCLE, FASCIA AND TENDON OF OTHER PARTS OF BICEPS, LEFT ARM, INITIAL ENCOUNTER: ICD-10-CM

## 2024-08-29 DIAGNOSIS — M75.112 INCOMPLETE ROTATOR CUFF TEAR OR RUPTURE OF LEFT SHOULDER, NOT SPECIFIED AS TRAUMATIC: ICD-10-CM

## 2024-08-29 PROCEDURE — 99455 WORK RELATED DISABILITY EXAM: CPT

## 2024-08-29 NOTE — DISCUSSION/SUMMARY
[de-identified] : (Scheduled Loss of Use Evaluation)  Injured body part: left shoulder Date of injury: 10/12/2022 Date of maximum medical improvement: 8/29/2024 Type of disability: permanent partial Medical diagnosis: left rotator cuff tear, post-operative proximal biceps rupture Surgery performed: left shoulder arthroscopy, debridement, subacromial decompression, arthroscopic biceps tenodesis by Dr Richard Gallegos on 2/10/2023. No surgery performed by me.    (Injured shoulder  measurements in degrees)  Active forward flexion: 130 Passive forward flexion: 150 Active extension: 30 Passive extension: 40 Active abduction: 80 Passive abduction: 90 Active adduction: 20 Passive adduction: 20 Active external rotation at 0 degrees abduction: 30 Passive external rotation at 0 degrees abduction: 40 Active external rotation at 90 degrees abduction: 10 Passive external rotation at 90 degrees abduction: 20 Active internal rotation: 20 (L5 vertebral level) Passive internal rotation: 20 (L5 vertebral level) Muscle atrophy: mild  The right shoulder function is limited due to unrelated worker's compensation claim.   (Assessment)  Values based on [place] Workers Compensation Guidelines for Determining Impairment, First Edition, November 22, 2017.  -Flexion/abduction loss is mild (+20%) -Internal/external rotation loss is moderate (+10%) -Rupture of the long head of the biceps is present (+10%)  Total loss of use is 40%  Visit CPT code: 19553

## 2024-08-30 ENCOUNTER — APPOINTMENT (OUTPATIENT)
Dept: ORTHOPEDIC SURGERY | Facility: CLINIC | Age: 54
End: 2024-08-30

## 2024-09-19 ENCOUNTER — APPOINTMENT (OUTPATIENT)
Dept: ORTHOPEDIC SURGERY | Facility: CLINIC | Age: 54
End: 2024-09-19

## 2024-10-07 ENCOUNTER — APPOINTMENT (OUTPATIENT)
Dept: ORTHOPEDIC SURGERY | Facility: CLINIC | Age: 54
End: 2024-10-07

## 2024-10-10 ENCOUNTER — APPOINTMENT (OUTPATIENT)
Dept: ORTHOPEDIC SURGERY | Facility: CLINIC | Age: 54
End: 2024-10-10